# Patient Record
Sex: FEMALE | URBAN - METROPOLITAN AREA
[De-identification: names, ages, dates, MRNs, and addresses within clinical notes are randomized per-mention and may not be internally consistent; named-entity substitution may affect disease eponyms.]

---

## 2021-01-04 ENCOUNTER — OFFICE VISIT (OUTPATIENT)
Dept: URBAN - METROPOLITAN AREA CLINIC 115 | Facility: CLINIC | Age: 46
End: 2021-01-04

## 2023-05-08 ENCOUNTER — OFFICE VISIT (OUTPATIENT)
Dept: URBAN - METROPOLITAN AREA CLINIC 115 | Facility: CLINIC | Age: 48
End: 2023-05-08

## 2024-05-21 ENCOUNTER — OFFICE VISIT (OUTPATIENT)
Dept: URBAN - METROPOLITAN AREA CLINIC 115 | Facility: CLINIC | Age: 49
End: 2024-05-21

## 2024-07-02 ENCOUNTER — LAB OUTSIDE AN ENCOUNTER (OUTPATIENT)
Dept: URBAN - METROPOLITAN AREA CLINIC 115 | Facility: CLINIC | Age: 49
End: 2024-07-02

## 2024-07-02 ENCOUNTER — OFFICE VISIT (OUTPATIENT)
Dept: URBAN - METROPOLITAN AREA CLINIC 115 | Facility: CLINIC | Age: 49
End: 2024-07-02
Payer: COMMERCIAL

## 2024-07-02 VITALS
TEMPERATURE: 97.9 F | HEART RATE: 57 BPM | RESPIRATION RATE: 16 BRPM | WEIGHT: 190.6 LBS | HEIGHT: 66 IN | SYSTOLIC BLOOD PRESSURE: 114 MMHG | DIASTOLIC BLOOD PRESSURE: 75 MMHG | BODY MASS INDEX: 30.63 KG/M2

## 2024-07-02 DIAGNOSIS — R93.2 ABNORMAL CT SCAN, LIVER: ICD-10-CM

## 2024-07-02 DIAGNOSIS — R79.89 ABNORMAL LFTS (LIVER FUNCTION TESTS): ICD-10-CM

## 2024-07-02 DIAGNOSIS — K76.0 STEATOSIS OF LIVER: ICD-10-CM

## 2024-07-02 DIAGNOSIS — Z86.010 PERSONAL HISTORY OF COLONIC POLYPS: ICD-10-CM

## 2024-07-02 PROBLEM — 428283002: Status: ACTIVE | Noted: 2024-07-02

## 2024-07-02 PROBLEM — 15634181000119107: Status: ACTIVE | Noted: 2024-07-02

## 2024-07-02 PROBLEM — 443371000124107: Status: ACTIVE | Noted: 2024-07-02

## 2024-07-02 PROBLEM — 166603001: Status: ACTIVE | Noted: 2024-07-02

## 2024-07-02 PROBLEM — 197321007: Status: ACTIVE | Noted: 2024-07-02

## 2024-07-02 PROCEDURE — 99203 OFFICE O/P NEW LOW 30 MIN: CPT | Performed by: INTERNAL MEDICINE

## 2024-07-02 RX ORDER — SODIUM, POTASSIUM,MAG SULFATES 17.5-3.13G
AS DIRECTED SOLUTION, RECONSTITUTED, ORAL ORAL ONCE
Qty: 1 KIT | Refills: 0 | OUTPATIENT
Start: 2024-07-02 | End: 2024-07-03

## 2024-07-02 NOTE — HPI-TODAY'S VISIT:
50 y/o hiispanic female with obesity BMI 30 that came for eval given moderate steatosis on Ct with normal liver enzymes and synthetic function.No clinical signs of advance liver disease,no alcohol abuse.No family hx of liver disease. She already try to lose weight with diet but is very difficult for her to do exercise  given back pain from herniated disk with spine surgery in the past.She refer polyps in the past but she dont remember when exactly it was done at [place].

## 2024-07-03 ENCOUNTER — DASHBOARD ENCOUNTERS (OUTPATIENT)
Age: 49
End: 2024-07-03

## 2024-07-11 ENCOUNTER — TELEPHONE ENCOUNTER (OUTPATIENT)
Dept: URBAN - METROPOLITAN AREA CLINIC 115 | Facility: CLINIC | Age: 49
End: 2024-07-11